# Patient Record
Sex: FEMALE | Race: BLACK OR AFRICAN AMERICAN | Employment: FULL TIME | URBAN - METROPOLITAN AREA
[De-identification: names, ages, dates, MRNs, and addresses within clinical notes are randomized per-mention and may not be internally consistent; named-entity substitution may affect disease eponyms.]

---

## 2017-03-22 ENCOUNTER — OFFICE VISIT (OUTPATIENT)
Dept: FAMILY MEDICINE CLINIC | Age: 22
End: 2017-03-22

## 2017-03-22 VITALS
DIASTOLIC BLOOD PRESSURE: 70 MMHG | BODY MASS INDEX: 24.98 KG/M2 | OXYGEN SATURATION: 99 % | HEART RATE: 66 BPM | SYSTOLIC BLOOD PRESSURE: 101 MMHG | HEIGHT: 63 IN | RESPIRATION RATE: 16 BRPM | TEMPERATURE: 97.8 F | WEIGHT: 141 LBS

## 2017-03-22 DIAGNOSIS — Z23 ENCOUNTER FOR IMMUNIZATION: ICD-10-CM

## 2017-03-22 DIAGNOSIS — Z00.00 ROUTINE GENERAL MEDICAL EXAMINATION AT A HEALTH CARE FACILITY: Primary | ICD-10-CM

## 2017-03-22 NOTE — MR AVS SNAPSHOT
Visit Information Date & Time Provider Department Dept. Phone Encounter #  
 3/22/2017 11:30 AM Sree Walton PA-C [address] [facility] [phone] [id number] Follow-up Instructions Return if symptoms worsen or fail to improve. Upcoming Health Maintenance Date Due  
 HPV AGE 9Y-34Y (1 of 3 - Female 3 Dose Series) 2/9/2007 INFLUENZA AGE 9 TO ADULT 8/1/2016 DTaP/Tdap/Td series (1 - Tdap) 2/9/2017 PAP AKA CERVICAL CYTOLOGY 2/9/2017 Allergies as of 3/22/2017  Review Complete On: 3/22/2017 By: Sree Walton PA-C No Known Allergies Current Immunizations  Never Reviewed Name Date Influenza Vaccine (Quad) PF  Incomplete Not reviewed this visit You Were Diagnosed With   
  
 Codes Comments Routine general medical examination at a health care facility    -  Primary ICD-10-CM: Z00.00 ICD-9-CM: V70.0 Encounter for immunization     ICD-10-CM: E46 ICD-9-CM: V03.89 Vitals BP Pulse Temp Resp Height(growth percentile) Weight(growth percentile) 101/70 (BP 1 Location: Right arm, BP Patient Position: Sitting) 66 97.8 °F (36.6 °C) (Oral) 16 5' 2.5\" (1.588 m) 141 lb (64 kg) LMP SpO2 BMI OB Status Smoking Status 03/14/2017 (Approximate) 99% 25.38 kg/m2 Having regular periods Never Smoker Vitals History BMI and BSA Data Body Mass Index Body Surface Area  
 25.38 kg/m 2 1.68 m 2 Preferred Pharmacy Pharmacy Name Phone [facility] [phone] [facility] [phone] Your Updated Medication List  
  
Notice  As of 3/22/2017 12:19 PM  
 You have not been prescribed any medications. We Performed the Following INFLUENZA VIRUS VAC QUAD,SPLIT,PRESV FREE SYRINGE 3/> YRS IM [id number] CPT(R)] Follow-up Instructions Return if symptoms worsen or fail to improve. Patient Instructions Your physical exam is normal.  
 Your right wrist is normal on exam: prognosis is good, no complications or limitations are expected. Introducing [facility]! Herve Brandon introduces [employer] patient portal. Now you can access parts of your medical record, email your doctor's office, and request medication refills online. 1. In your internet browser, go to [email or web address] 2. Click on the First Time User? Click Here link in the Sign In box. You will see the New Member Sign Up page. 3. Enter your [employer] Access Code exactly as it appears below. You will not need to use this code after youve completed the sign-up process. If you do not sign up before the expiration date, you must request a new code. · [employer] Access Code: [email or web address] Expires: 6/20/2017 11:45 AM 
 
4. Enter the last four digits of your Social Security Number (xxxx) and Date of Birth (mm/dd/yyyy) as indicated and click Submit. You will be taken to the next sign-up page. 5. Create a [employer] ID. This will be your [employer] login ID and cannot be changed, so think of one that is secure and easy to remember. 6. Create a [employer] password. You can change your password at any time. 7. Enter your Password Reset Question and Answer. This can be used at a later time if you forget your password. 8. Enter your e-mail address. You will receive e-mail notification when new information is available in [address] 9. Click Sign Up. You can now view and download portions of your medical record. 10. Click the Download Summary menu link to download a portable copy of your medical information. If you have questions, please visit the Frequently Asked Questions section of the [employer] website. Remember, [employer] is NOT to be used for urgent needs. For medical emergencies, dial 911. Now available from your iPhone and Android! Please provide this summary of care documentation to your next provider. If you have any questions after today's visit, please call [phone].

## 2017-03-22 NOTE — PROGRESS NOTES
Rm 7  Pt presents to the clinic for a CPE for the [employer]. Flu shot requested: no    Depression Screening Completed: yes    Learning Assessment Completed: yes    Abuse Screening Completed: n/a    Health Maintenance reviewed and discussed per provider: yes     Coordination of Care:    1. Have you been to the ER, urgent care clinic since your last visit? Hospitalized since your last visit? no    2. Have you seen or consulted any other health care providers outside of the [address] [place] [address] since your last visit? Include any pap smears or colon screening.  no

## 2017-03-22 NOTE — PATIENT INSTRUCTIONS
Your physical exam is normal.   Your right wrist is normal on exam: prognosis is good, no complications or limitations are expected.

## 2017-03-22 NOTE — PROGRESS NOTES
Pt received influenza vaccine 0.5ml in left deltoid. Tolerated well. No signs or symptoms of distress noted. Most current VIS given and consent signed.

## 2017-03-22 NOTE — PROGRESS NOTES
HISTORY OF PRESENT ILLNESS  Seamus Martines is a 25 y.o. female. HPI Comments: Pt presents for a physical for the [employer]. In particular, she had a stress fracture of the wrist about 4-5 years ago during cheerleading. States she needs clearance specifying diagnosis and prognosis. She denies any pain or loss of ROM in the wrist. Denies any chronic health issues, medication usage, or acute complaints. Complete Physical   Pertinent negatives include no chest pain, no headaches and no shortness of breath. Review of Systems   Constitutional: Negative for chills, fever and malaise/fatigue. HENT: Negative for congestion, ear pain and sore throat. Eyes: Negative for pain. Respiratory: Negative for cough and shortness of breath. Cardiovascular: Negative for chest pain and palpitations. Gastrointestinal: Negative for diarrhea, nausea and vomiting. Genitourinary: Negative for dysuria and hematuria. Musculoskeletal: Negative for back pain, joint pain and myalgias. Skin: Negative for itching and rash. Neurological: Negative for dizziness, weakness and headaches. Psychiatric/Behavioral: Negative for depression. The patient is not nervous/anxious and does not have insomnia. Visit Vitals    /70 (BP 1 Location: Right arm, BP Patient Position: Sitting)    Pulse 66    Temp 97.8 °F (36.6 °C) (Oral)    Resp 16    Ht 5' 2.5\" (1.588 m)    Wt 141 lb (64 kg)    LMP 03/14/2017 (Approximate)    SpO2 99%    BMI 25.38 kg/m2       Physical Exam   Constitutional: She is oriented to person, place, and time. Vital signs are normal. She appears well-developed and well-nourished. She is cooperative. She does not appear ill. No distress. HENT:   Head: Normocephalic and atraumatic. Right Ear: Tympanic membrane, external ear and ear canal normal.   Left Ear: Tympanic membrane, external ear and ear canal normal.   Nose: Nose normal. No mucosal edema or rhinorrhea.    Mouth/Throat: Uvula is midline, oropharynx is clear and moist and mucous membranes are normal.   Eyes: Conjunctivae and EOM are normal. Pupils are equal, round, and reactive to light. Neck: Normal range of motion. Neck supple. No spinous process tenderness and no muscular tenderness present. Cardiovascular: Normal rate, regular rhythm and normal heart sounds. Exam reveals no gallop and no friction rub. No murmur heard. Pulses:       Radial pulses are 2+ on the right side, and 2+ on the left side. Pulmonary/Chest: Effort normal and breath sounds normal. She has no decreased breath sounds. She has no wheezes. She has no rhonchi. She has no rales. Abdominal: Soft. Normal appearance. There is no hepatosplenomegaly. There is no tenderness. No hernia. Hernia confirmed negative in the ventral area. Musculoskeletal: Normal range of motion. She exhibits no edema, tenderness or deformity. Lymphadenopathy:     She has no cervical adenopathy. Neurological: She is alert and oriented to person, place, and time. She has normal strength. She displays a negative Romberg sign. Coordination and gait normal.   Reflex Scores:       Patellar reflexes are 2+ on the right side and 2+ on the left side. CN II-XII   Skin: Skin is warm and dry. No rash noted. Psychiatric: She has a normal mood and affect. Her speech is normal and behavior is normal. Thought content normal.   Nursing note and vitals reviewed. ASSESSMENT and PLAN  Healthy, young female with no medical issues. In particular, there is no pain or deformity noted in the right wrist; FROM. She is cleared for participation in all [employer] training activities. ICD-10-CM ICD-9-CM    1. Routine general medical examination at a health care facility Z00.00 V70.0    2.  Encounter for immunization Z23 V03.89 INFLUENZA VIRUS VAC QUAD,SPLIT,PRESV FREE SYRINGE 3/> YRS IM         Anatoly Sewell PA-C

## 2017-06-20 ENCOUNTER — OFFICE VISIT (OUTPATIENT)
Dept: FAMILY MEDICINE CLINIC | Age: 22
End: 2017-06-20

## 2017-06-20 VITALS
SYSTOLIC BLOOD PRESSURE: 113 MMHG | WEIGHT: 145 LBS | BODY MASS INDEX: 25.69 KG/M2 | HEIGHT: 63 IN | TEMPERATURE: 98.3 F | DIASTOLIC BLOOD PRESSURE: 66 MMHG | HEART RATE: 87 BPM | OXYGEN SATURATION: 98 % | RESPIRATION RATE: 16 BRPM

## 2017-06-20 DIAGNOSIS — L73.9 FOLLICULITIS: Primary | ICD-10-CM

## 2017-06-20 RX ORDER — SULFAMETHOXAZOLE AND TRIMETHOPRIM 800; 160 MG/1; MG/1
1 TABLET ORAL 2 TIMES DAILY
Qty: 20 TAB | Refills: 0 | Status: SHIPPED | OUTPATIENT
Start: 2017-06-20 | End: 2017-06-30

## 2017-06-20 NOTE — PATIENT INSTRUCTIONS
Look for a noncomedogenic body wash. Consider using a body wash with salicylic acid every other day. Check out Dr Yasmeen Dewey suggestions for treating your acne without medication:     http://Visionarity/blog/2015/11/04/10-simple-strategies-to-eliminate-acne/    http://[employer]/blog/2010/05/19/how-to-get-rid-of-acne-pimples-and-other-skin-problems/    [employer] also has some suggestions that may help:   https://Trampoline.AccessData/13-acne-remedies/    A healthy gut contains many species of bacteria and yeast that can help with the breakdown of starches, and improve the function of the immune system (probiotics). Good gut una also help with the absorption of nutrients such as vitamins A,D,E, and K, as well as calcium, iron, and chromium. Probiotic foods or supplements help to add microorganisms to the stomach and intestines. These foods include fermented foods such as yogurt, sauerkraut, kefir, kimchi, natto, and miso. Prebiotics are foods that help feed the existing microorganisms in the gut. These include bananas, artichokes, leeks, garlic, and onions. http://Trampoline. AccessData/probiotics-101/  https://Trampoline.AccessData/19-best-prebiotic-foods/  https://Trampoline. AccessData/8-health-benefits-of-probiotics/  https://Trampoline.AccessData/best-probiotic-supplement/    They may also aid weight loss:   https://you. be/1CJKjWlrDug (from the [place] [address])             Folliculitis: Care Instructions  Your Care Instructions    Folliculitis (say \"jat-YDG-ctg-LY-tus\") is an infection of the pouches (follicles) in the skin where hair grows. It can occur on any part of the body, but it is most common on the scalp, face, armpits, and groin. Bacteria, such as those found in a hot tub, can cause folliculitis. Folliculitis begins as a red, tender area near a strand of hair. The skin can itch or burn and may drain pus or blood.  Sometimes folliculitis can lead to more serious skin infections. Your doctor usually can treat mild folliculitis with an antibiotic cream or ointment. If you have folliculitis on your scalp, you may use a shampoo that kills bacteria. Antibiotics you take as pills can treat infections deeper in the skin. For stubborn cases of folliculitis, laser treatment may be an option. Laser treatment uses strong beams of light to destroy the hair follicle. But hair will no longer grow in the treated area. Follow-up care is a key part of your treatment and safety. Be sure to make and go to all appointments, and call your doctor if you are having problems. It's also a good idea to know your test results and keep a list of the medicines you take. How can you care for yourself at home? · Take your medicine exactly as prescribed. If your doctor prescribed antibiotics, take them as directed. Do not stop taking them just because you feel better. You need to take the full course of antibiotics. · Use a soap that kills bacteria to wash the infected area. If your scalp or beard is infected, use a shampoo with selenium or propylene glycol. Be careful. Do not scrub too long or too hard. · Mix 1 1/3 cup warm water and 1 tablespoon vinegar. Soak a cloth in the mixture, and place it over the infected skin until it cools off (usually 5 to 10 minutes). You can do this 3 to 6 times a day. · Do not share your razor, towel, or washcloth. That can spread folliculitis. · Use a new blade in your razor each time you shave to keep from re-infecting your skin. · If you tend to get folliculitis, avoid using hot tubs. They can contain bacteria that cause folliculitis. When should you call for help? Call your doctor now or seek immediate medical care if:  · You have symptoms of infection, such as:  ¨ Increased pain, swelling, warmth, or redness. ¨ Red streaks leading from the area. ¨ Pus draining from the area. ¨ A fever.   Watch closely for changes in your health, and be sure to contact your doctor if:  · You do not get better as expected. Where can you learn more? Go to http://tray-michelle.info/. Enter M257 in the search box to learn more about \"Folliculitis: Care Instructions. \"  Current as of: October 13, 2016  Content Version: 11.3  © 8623-5610 [employer]. Care instructions adapted under license by [employer] (which disclaims liability or warranty for this information). If you have questions about a medical condition or this instruction, always ask your healthcare professional. [address] any warranty or liability for your use of this information.

## 2017-06-20 NOTE — PROGRESS NOTES
HISTORY OF PRESENT ILLNESS  Jakub Gardner is a 25 y.o. female. HPI Comments: Pt presents with her mom with sore bumps on her legs, buttocks, and lower abdomen. She notes she has been getting them since around the start of her pregnancy in 2015, but notes they have become particularly bad over the past month. The pain is worse with showering, or contact with the bumps; which drain pus at times. Stats she has addressed this with other providers previously, but was merely told to come back if the condition worsens. Mom notes that oleg also gets abscesses. States she was given Bactrim in December for two cysts in the vaginal area that ruptured, draining pus. She has a Mirena IUD in place that was placed in December of 2015, but mom notes the abscesses and folliculitis started around the beginning of her pregnancy (earlier in the year). She currently washes with Dove soap. Skin Problem   Pertinent negatives include no shortness of breath. Review of Systems   Constitutional: Negative for chills and fever. Respiratory: Negative for cough and shortness of breath. Genitourinary: Negative for dysuria. Denies vaginal discharge   Musculoskeletal: Positive for myalgias (worse with working). Skin: Positive for rash. Negative for itching. Neurological: Negative for tingling. Visit Vitals    /66 (BP 1 Location: Right arm, BP Patient Position: Sitting)    Pulse 87    Temp 98.3 °F (36.8 °C) (Oral)    Resp 16    Ht 5' 2.5\" (1.588 m)    Wt 145 lb (65.8 kg)    LMP 06/06/2017 (Approximate)    SpO2 98%    BMI 26.1 kg/m2       Physical Exam   Constitutional: She is oriented to person, place, and time. Vital signs are normal. She appears well-developed and well-nourished. She is cooperative. She does not appear ill. No distress. HENT:   Head: Normocephalic and atraumatic.    Right Ear: External ear normal.   Left Ear: External ear normal.   Nose: Nose normal.   Mouth/Throat: Uvula is midline, oropharynx is clear and moist and mucous membranes are normal.   Eyes: Conjunctivae are normal.   Neck: Neck supple. Cardiovascular: Normal rate, regular rhythm and normal heart sounds. Pulses:       Radial pulses are 2+ on the right side, and 2+ on the left side. Pulmonary/Chest: Effort normal and breath sounds normal. She has no decreased breath sounds. She has no wheezes. She has no rhonchi. She has no rales. Lymphadenopathy:     She has no cervical adenopathy. Right: No inguinal adenopathy present. Left: No inguinal adenopathy present. Neurological: She is alert and oriented to person, place, and time. Skin: Skin is warm and dry. Rash noted. Rash is nodular and pustular. Rash is not vesicular. She is not diaphoretic. Pt has multiple nodular lesions in various stages of eruption/resolution on the lower abdomen and posterior thighs. Most of the nodules are ~ 1 cm in size. Some are markedly erythematous, but without red streaking. No drainage of pus appreciated. The nodules are tender, but there is no increased warmth. Psychiatric: She has a normal mood and affect. Her speech is normal and behavior is normal. Thought content normal.   Nursing note and vitals reviewed. ASSESSMENT and PLAN    ICD-10-CM ICD-9-CM    1. Folliculitis H58.9 951.5 trimethoprim-sulfamethoxazole (BACTRIM DS, SEPTRA DS) 160-800 mg per tablet      REFERRAL TO DERMATOLOGY     Recommended non-comedogenic body washes and moisturizers, with use of a salicylic acid body wash every other day. Also provided links to articles by Dr Prachi Pryor and [employer] and briefly discussed a trial elimination of dairy; and the role of diet in skin health. Encouraged reduced consumption of sugar and refined carbohydrates; increased consumption of vegetables & fruits. Provided after-visit information on  Folliculitis. Reviewed reasons to return or seek emergency care.     Pt verbalized understanding and agreement with the plan of care.     Disha Marcos PA-C

## 2017-06-20 NOTE — MR AVS SNAPSHOT
Visit Information Date & Time Provider Department Dept. Phone Encounter #  
 6/20/2017  2:00 PM Pearla Pallas, PA-C [facility] [phone] [id number] Upcoming Health Maintenance Date Due  
 HPV AGE 9Y-34Y (1 of 3 - Female 3 Dose Series) 2/9/2007 DTaP/Tdap/Td series (1 - Tdap) 2/9/2017 PAP AKA CERVICAL CYTOLOGY 2/9/2017 INFLUENZA AGE 9 TO ADULT 8/1/2017 Allergies as of 6/20/2017  Review Complete On: 6/20/2017 By: Pearla Pallas, PA-C No Known Allergies Current Immunizations  Never Reviewed Name Date Influenza Vaccine (Quad) PF 3/22/2017 Not reviewed this visit You Were Diagnosed With   
  
 Codes Comments Folliculitis    -  Primary ICD-10-CM: L73.9 ICD-9-CM: 704.8 Vitals BP Pulse Temp Resp Height(growth percentile) Weight(growth percentile) 113/66 (BP 1 Location: Right arm, BP Patient Position: Sitting) 87 98.3 °F (36.8 °C) (Oral) 16 5' 2.5\" (1.588 m) 145 lb (65.8 kg) LMP SpO2 BMI OB Status Smoking Status 06/06/2017 (Approximate) 98% 26.1 kg/m2 Having regular periods Never Smoker BMI and BSA Data Body Mass Index Body Surface Area  
 26.1 kg/m 2 1.7 m 2 Preferred Pharmacy Pharmacy Name Phone [facility] [place] [address], [address] [facility] [phone] Your Updated Medication List  
  
   
This list is accurate as of: 6/20/17  2:46 PM.  Always use your most recent med list.  
  
  
  
  
 trimethoprim-sulfamethoxazole 160-800 mg per tablet Commonly known as:  BACTRIM DS, SEPTRA DS Take 1 Tab by mouth two (2) times a day for 10 days. Prescriptions Sent to Pharmacy Refills  
 trimethoprim-sulfamethoxazole (BACTRIM DS, SEPTRA DS) 160-800 mg per tablet 0 Sig: Take 1 Tab by mouth two (2) times a day for 10 days. Class: Normal  
 Pharmacy: [address] [place] [address], [address] [facility] Ph #: [phone]  Route: Oral  
  
 We Performed the Following REFERRAL TO DERMATOLOGY [REF19 Custom] Comments:  
 Please evaluate patient for recurrent folliculitis [place] [facility] Referral Information Referral ID Referred By Referred To  
  
 [id number] DIANDRA [address], [place]   
   [address] [facility] [address] Phone: [phone] Fax: [phone] Visits Status Start Date End Date 1 New Request 6/20/17 6/20/18 If your referral has a status of pending review or denied, additional information will be sent to support the outcome of this decision. Patient Instructions Look for a noncomedogenic body wash. Consider using a body wash with salicylic acid every other day. Check out Dr Doug Gan suggestions for treating your acne without medication:  
 
http://[employer]/blog/2015/11/04/10-simple-strategies-to-eliminate-acne/ 
 
http://[employer]/blog/2010/05/19/how-to-get-rid-of-acne-pimples-and-other-skin-problems/ Authority Nutrition also has some suggestions that may help:  
https://[employer].Appifier/13-acne-remedies/ 
 
A healthy gut contains many species of bacteria and yeast that can help with the breakdown of starches, and improve the function of the immune system (probiotics). Good gut una also help with the absorption of nutrients such as vitamins A,D,E, and K, as well as calcium, iron, and chromium. Probiotic foods or supplements help to add microorganisms to the stomach and intestines. These foods include fermented foods such as yogurt, sauerkraut, kefir, kimchi, natto, and miso. Prebiotics are foods that help feed the existing microorganisms in the gut. These include bananas, artichokes, leeks, garlic, and onions. http://authoritynutrition. com/probiotics-101/ 
https://authoritynutrition.com/19-best-prebiotic-foods/ 
https://Canatunutrition. com/8-health-benefits-of-probiotics/ 
https://authorityReveal Technology.com/best-probiotic-supplement/ 
 
 They may also aid weight loss:  
https://youtu. be/1CJKjWlrDug (from the [place] [address]) Folliculitis: Care Instructions Your Care Instructions Folliculitis (say \"yaf-TIP-gem-NEREYDA-kadi\") is an infection of the pouches (follicles) in the skin where hair grows. It can occur on any part of the body, but it is most common on the scalp, face, armpits, and groin. Bacteria, such as those found in a hot tub, can cause folliculitis. Folliculitis begins as a red, tender area near a strand of hair. The skin can itch or burn and may drain pus or blood. Sometimes folliculitis can lead to more serious skin infections. Your doctor usually can treat mild folliculitis with an antibiotic cream or ointment. If you have folliculitis on your scalp, you may use a shampoo that kills bacteria. Antibiotics you take as pills can treat infections deeper in the skin. For stubborn cases of folliculitis, laser treatment may be an option. Laser treatment uses strong beams of light to destroy the hair follicle. But hair will no longer grow in the treated area. Follow-up care is a key part of your treatment and safety. Be sure to make and go to all appointments, and call your doctor if you are having problems. It's also a good idea to know your test results and keep a list of the medicines you take. How can you care for yourself at home? · Take your medicine exactly as prescribed. If your doctor prescribed antibiotics, take them as directed. Do not stop taking them just because you feel better. You need to take the full course of antibiotics. · Use a soap that kills bacteria to wash the infected area. If your scalp or beard is infected, use a shampoo with selenium or propylene glycol. Be careful. Do not scrub too long or too hard. · Mix 1 1/3 cup warm water and 1 tablespoon vinegar.  Soak a cloth in the mixture, and place it over the infected skin until it cools off (usually 5 to 10 minutes). You can do this 3 to 6 times a day. · Do not share your razor, towel, or washcloth. That can spread folliculitis. · Use a new blade in your razor each time you shave to keep from re-infecting your skin. · If you tend to get folliculitis, avoid using hot tubs. They can contain bacteria that cause folliculitis. When should you call for help? Call your doctor now or seek immediate medical care if: 
· You have symptoms of infection, such as: 
¨ Increased pain, swelling, warmth, or redness. ¨ Red streaks leading from the area. ¨ Pus draining from the area. ¨ A fever. Watch closely for changes in your health, and be sure to contact your doctor if: 
· You do not get better as expected. Where can you learn more? Go to http://tray-michelle.info/. Enter M257 in the search box to learn more about \"Folliculitis: Care Instructions. \" Current as of: October 13, 2016 Content Version: 11.3 © 6425-9852 [employer]ap. Care instructions adapted under license by [employer] (which disclaims liability or warranty for this information). If you have questions about a medical condition or this instruction, always ask your healthcare professional. [address] any warranty or liability for your use of this information. Introducing [facility] [place] & HEALTH SERVICES! Asa Mo introduces [employer] patient portal. Now you can access parts of your medical record, email your doctor's office, and request medication refills online. 1. In your internet browser, go to [email or web address] 2. Click on the First Time User? Click Here link in the Sign In box. You will see the New Member Sign Up page. 3. Enter your Ent[employer]Soft Access Code exactly as it appears below. You will not need to use this code after youve completed the sign-up process. If you do not sign up before the expiration date, you must request a new code. · Nanoflex Access Code: [email or web address] Expires: 9/18/2017  2:46 PM 
 
4. Enter the last four digits of your Social Security Number (xxxx) and Date of Birth (mm/dd/yyyy) as indicated and click Submit. You will be taken to the next sign-up page. 5. Create a Nanoflex ID. This will be your Nanoflex login ID and cannot be changed, so think of one that is secure and easy to remember. 6. Create a Nanoflex password. You can change your password at any time. 7. Enter your Password Reset Question and Answer. This can be used at a later time if you forget your password. 8. Enter your e-mail address. You will receive e-mail notification when new information is available in [address] 9. Click Sign Up. You can now view and download portions of your medical record. 10. Click the Download Summary menu link to download a portable copy of your medical information. If you have questions, please visit the Frequently Asked Questions section of the Nanoflex website. Remember, Nanoflex is NOT to be used for urgent needs. For medical emergencies, dial 911. Now available from your iPhone and Android! Please provide this summary of care documentation to your next provider. If you have any questions after today's visit, please call [phone].

## 2017-08-03 ENCOUNTER — OFFICE VISIT (OUTPATIENT)
Dept: FAMILY MEDICINE CLINIC | Age: 22
End: 2017-08-03

## 2017-08-03 VITALS
RESPIRATION RATE: 16 BRPM | TEMPERATURE: 98.2 F | HEIGHT: 63 IN | SYSTOLIC BLOOD PRESSURE: 112 MMHG | DIASTOLIC BLOOD PRESSURE: 67 MMHG | OXYGEN SATURATION: 99 % | BODY MASS INDEX: 24.8 KG/M2 | HEART RATE: 78 BPM | WEIGHT: 140 LBS

## 2017-08-03 DIAGNOSIS — R93.6 ABNORMAL X-RAY OF EXTREMITY: Primary | ICD-10-CM

## 2017-08-03 NOTE — PATIENT INSTRUCTIONS
If you get your xray today, I can have your letter written tomorrow (assuming everything is ok)  If it's abnormal, I'll get you into ortho ASAP for clearance.

## 2017-08-03 NOTE — MR AVS SNAPSHOT
Visit Information Date & Time Provider Department Dept. Phone Encounter #  
 8/3/2017  2:45 PM Abilio Zeng, [address] [phone] [id number] Follow-up Instructions Return if symptoms worsen or fail to improve. Upcoming Health Maintenance Date Due  
 HPV AGE 9Y-34Y (1 of 3 - Female 3 Dose Series) 2/9/2007 DTaP/Tdap/Td series (1 - Tdap) 2/9/2017 PAP AKA CERVICAL CYTOLOGY 2/9/2017 INFLUENZA AGE 9 TO ADULT 8/1/2017 Allergies as of 8/3/2017  Review Complete On: 8/3/2017 By: Abilio Zeng MD  
 No Known Allergies Current Immunizations  Never Reviewed Name Date Influenza Vaccine (Quad) PF 3/22/2017 Not reviewed this visit You Were Diagnosed With   
  
 Codes Comments Abnormal x-ray of extremity    -  Primary ICD-10-CM: R93.7 ICD-9-CM: 793.7 Vitals BP Pulse Temp Resp Height(growth percentile) Weight(growth percentile) 112/67 (BP 1 Location: Right arm, BP Patient Position: Sitting) 78 98.2 °F (36.8 °C) (Oral) 16 5' 2.5\" (1.588 m) 140 lb (63.5 kg) LMP SpO2 BMI OB Status Smoking Status 08/02/2017 (Exact Date) 99% 25.2 kg/m2 Having regular periods Never Smoker Vitals History BMI and BSA Data Body Mass Index Body Surface Area  
 25.2 kg/m 2 1.67 m 2 Preferred Pharmacy Pharmacy Name Phone [facility] [address] [facility] [phone] Your Updated Medication List  
  
Notice  As of 8/3/2017  3:27 PM  
 You have not been prescribed any medications. Follow-up Instructions Return if symptoms worsen or fail to improve. To-Do List   
 08/03/2017 Imaging:  XR HUMERUS RT Patient Instructions If you get your xray today, I can have your letter written tomorrow (assuming everything is ok) If it's abnormal, I'll get you into ortho ASAP for clearance. Introducing \[facility] [place] [facility]\"" & HEALTH SERVICES! [employer] introduces International [employer] patient portal. Now you can access parts of your medical record, email your doctor's office, and request medication refills online. 1. In your internet browser, go to [email or web address] 2. Click on the First Time User? Click Here link in the Sign In box. You will see the New Member Sign Up page. 3. Enter your International [employer] Organization Access Code exactly as it appears below. You will not need to use this code after youve completed the sign-up process. If you do not sign up before the expiration date, you must request a new code. · International [employer] Organization Access Code: [email or web address] Expires: 9/18/2017  2:46 PM 
 
4. Enter the last four digits of your Social Security Number (xxxx) and Date of Birth (mm/dd/yyyy) as indicated and click Submit. You will be taken to the next sign-up page. 5. Create a International Youth Organization ID. This will be your International [employer] Organization login ID and cannot be changed, so think of one that is secure and easy to remember. 6. Create a International [employer] Organization password. You can change your password at any time. 7. Enter your Password Reset Question and Answer. This can be used at a later time if you forget your password. 8. Enter your e-mail address. You will receive e-mail notification when new information is available in [address] 9. Click Sign Up. You can now view and download portions of your medical record. 10. Click the Download Summary menu link to download a portable copy of your medical information. If you have questions, please visit the Frequently Asked Questions section of the International Youth Organization website. Remember, International [employer] Organization is NOT to be used for urgent needs. For medical emergencies, dial 911. Now available from your iPhone and Android! Please provide this summary of care documentation to your next provider. If you have any questions after today's visit, please call [phone].

## 2017-08-03 NOTE — PROGRESS NOTES
HISTORY OF PRESENT ILLNESS  Amalia Nelson is a 25 y.o. female. HPI Comments: Krista Gonzalez is here to get her R arm evaluated. Five years ago she inured her arm while cheerleading. She was diagnosed with a shoulder contusion and a \"stress fracture of her wrist\". She was treated, everything got better and she is pain-free now. During the course of that workup, they found something incidentally in her humerus area (? 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      Review of Systems   Constitutional: Negative for chills and fever. Eyes: Negative for blurred vision and double vision. Respiratory: Negative for cough and shortness of breath. Cardiovascular: Negative for chest pain and palpitations. Gastrointestinal: Negative for abdominal pain, nausea and vomiting. Neurological: Negative for headaches. Physical Exam   Constitutional: Vital signs are normal. She appears well-developed and well-nourished. HENT:   Right Ear: Tympanic membrane and ear canal normal.   Left Ear: Tympanic membrane and ear canal normal.   Nose: Nose normal.   Mouth/Throat: Uvula is midline, oropharynx is clear and moist and mucous membranes are normal.   Eyes: Pupils are equal, round, and reactive to light. Cardiovascular: Normal rate and regular rhythm. Pulmonary/Chest: Effort normal and breath sounds normal.   Musculoskeletal:   R arm, shoulder, elbow, wrist all non-tender, no swelling, full ROM   Skin: No rash noted. Nursing note and vitals reviewed. ASSESSMENT and PLAN    ICD-10-CM ICD-9-CM    1. Abnormal x-ray of extremity R93.7 793.7 XR HUMERUS RT       Will get an xray today.

## 2017-08-03 NOTE — PROGRESS NOTES
Rm 1  Pt presents to the clinic requesting an XR of her right arm. Pt states she was told many years ago that something was found on an old XR and the [employer] wants clarification as to what it is. Depression Screening Completed: yes    Learning Assessment Completed: yes    Abuse Screening Completed: n/a    Health Maintenance reviewed and discussed per provider: yes     Coordination of Care:    1. Have you been to the ER, urgent care clinic since your last visit? Hospitalized since your last visit? no    2. Have you seen or consulted any other health care providers outside of the [address] [place] [address] since your last visit? Include any pap smears or colon screening.  no

## 2017-08-07 ENCOUNTER — TELEPHONE (OUTPATIENT)
Dept: FAMILY MEDICINE CLINIC | Age: 22
End: 2017-08-07

## 2017-08-07 DIAGNOSIS — R93.6 ABNORMAL X-RAY OF EXTREMITY: Primary | ICD-10-CM

## 2017-08-07 NOTE — TELEPHONE ENCOUNTER
----- Message from Mel Mustafa LPN sent at 8/8/7363  7:45 AM EDT -----  Pt called back in regards to her XR results. I informed her of her abnormal lesion and the recommendation for an MRI. Pt verbalized understanding and would like the MRI ordered. I provided the number to central scheduling to schedule the MRI.

## 2017-08-07 NOTE — TELEPHONE ENCOUNTER
Pt requesting an update on image request.   Pt was informed she will receive a call once order was placed. Pt verbalized understanding.

## 2017-08-16 ENCOUNTER — HOSPITAL ENCOUNTER (OUTPATIENT)
Dept: MRI IMAGING | Age: 22
Discharge: HOME | End: 2017-08-16
Attending: FAMILY MEDICINE
Payer: MEDICAID

## 2017-08-16 VITALS — WEIGHT: 140 LBS | BODY MASS INDEX: 25.2 KG/M2

## 2017-08-16 DIAGNOSIS — R93.6 ABNORMAL X-RAY OF EXTREMITY: ICD-10-CM

## 2017-08-16 PROCEDURE — 73220 MRI UPPR EXTREMITY W/O&W/DYE: CPT

## 2017-08-16 PROCEDURE — 74011250636 HC RX REV CODE- 250/636: Performed by: FAMILY MEDICINE

## 2017-08-16 PROCEDURE — A9585 GADOBUTROL INJECTION: HCPCS | Performed by: FAMILY MEDICINE

## 2017-08-16 RX ADMIN — GADOBUTROL 6.5 ML: 604.72 INJECTION INTRAVENOUS at 15:36

## 2017-08-21 ENCOUNTER — TELEPHONE (OUTPATIENT)
Dept: FAMILY MEDICINE CLINIC | Age: 22
End: 2017-08-21

## 2017-08-21 NOTE — TELEPHONE ENCOUNTER
Patient called the office requesting her lab results. Patient was informed Dr. Nathalia Ny reviewed her MRI results and it indicated from a medical standpoint the lesion appears benign. Patient was advised if further clearance id needed we would have to refer her to the specialist.    Patient stated she is going out of town on Thursday and would like a copy of the xray and MRI faxed over to the [facility]. Patient verbalized understanding and had no further questions.

## 2017-08-22 NOTE — PROGRESS NOTES
Patient called the office requesting her lab results. Patient was informed Dr. Rowan Gandhi reviewed her MRI results and it indicated from a medical standpoint the lesion appears benign. Patient was advised if further clearance is needed we would have to refer her to the specialist.     Patient stated she is going out of town on Thursday and would like a copy of the xray and MRI faxed over to the [facility] [employer]. Patient verbalized understanding and had no further questions.

## 2017-09-05 ENCOUNTER — TELEPHONE (OUTPATIENT)
Dept: FAMILY MEDICINE CLINIC | Age: 22
End: 2017-09-05

## 2017-09-05 NOTE — TELEPHONE ENCOUNTER
Pt is requesting an MRI clearance be faxed over to [facility] [phone]. Clearance must state no findings in MRI and Pt has no restriction.

## 2017-09-06 ENCOUNTER — TELEPHONE (OUTPATIENT)
Dept: FAMILY MEDICINE CLINIC | Age: 22
End: 2017-09-06

## 2017-09-06 DIAGNOSIS — R93.6 ABNORMAL MRI, SHOULDER: Primary | ICD-10-CM

## 2017-09-11 NOTE — TELEPHONE ENCOUNTER
Patient called the office on 09/06/17 for a referral to ortho for medical clearance.      Patient has an appointment scheduled for 09/27/17 at 2:30 pm.

## 2017-09-27 ENCOUNTER — OFFICE VISIT (OUTPATIENT)
Dept: ORTHOPEDIC SURGERY | Age: 22
End: 2017-09-27

## 2017-09-27 VITALS
DIASTOLIC BLOOD PRESSURE: 68 MMHG | RESPIRATION RATE: 18 BRPM | HEIGHT: 63 IN | SYSTOLIC BLOOD PRESSURE: 112 MMHG | HEART RATE: 72 BPM | BODY MASS INDEX: 25.2 KG/M2 | WEIGHT: 142.2 LBS | TEMPERATURE: 98.2 F | OXYGEN SATURATION: 100 %

## 2017-09-27 DIAGNOSIS — D21.11: Primary | ICD-10-CM

## 2017-09-27 NOTE — PROGRESS NOTES
Yesenia Anaya  1995   Chief Complaint   Patient presents with    Shoulder Pain     Right        HISTORY OF PRESENT ILLNESS  Carmin Lanes is a 25 y.o. female who presents today for evaluation of right shoulder pain. Patient referred by Dr. Noah Drake for further evaluation. she rates her pain 0/10 today. She states she injured the arm while in high school about 6 years ago. She had an MRI of that time but never sought further treatment. 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 She states she has not had any recent trouble with the arm. She has no associated symptoms associated with the findings on the MRI. Has tried following treatments: Injections:NO; Brace:NO; Therapy:NO; Cane/Crutch:NO       No Known Allergies     History reviewed. No pertinent past medical history. Social History     Social History    Marital status: SINGLE     Spouse name: N/A    Number of children: N/A    Years of education: N/A     Occupational History    Not on file. Social History Main Topics    Smoking status: Never Smoker    Smokeless tobacco: Never Used    Alcohol use Yes      Comment: socially    Drug use: No    Sexual activity: Yes     Partners: Male     Other Topics Concern    Not on file     Social History Narrative      History reviewed. No pertinent surgical history. Family History   Problem Relation Age of Onset    No Known Problems Mother     No Known Problems Father       No current outpatient prescriptions on file. No current facility-administered medications for this visit. REVIEW OF SYSTEM   Patient denies: Weight loss, Fever/Chills, HA, Visual changes, Fatigue, Chest pain, SOB, Abdominal pain, N/V/D/C, Blood in stool or urine, Edema. Pertinent positive as above in HPI.  All others were negative    PHYSICAL EXAM:   Visit Vitals    /68    Pulse 72    Temp 98.2 °F (36.8 °C) (Oral)    Resp 18    Ht 5' 2.5\" (1.588 m)    Wt 142 lb 3.2 oz (64.5 kg)  SpO2 100%    BMI 25.59 kg/m2     The patient is a well-developed, well-nourished female   in no acute distress. The patient is alert and oriented times three. The patient is alert and oriented times three. Mood and affect are normal.  LYMPHATIC: lymph nodes are not enlarged and are within normal limits  SKIN: normal in color and non tender to palpation. There are no bruises or abrasions noted. NEUROLOGICAL: Motor sensory exam is within normal limits. Reflexes are equal bilaterally. There is normal sensation to pinprick and light touch  MUSCULOSKELETAL:  Examination Right shoulder   Skin Intact   AC joint tenderness -   Biceps tenderness -   Forward flexion/Elevation    Active abduction    Glenohumeral abduction 90   External rotation ROM 90   Internal rotation ROM 70   Apprehension -   Elmers Relocation -   Jerk -   Load and Shift -   Obriens -   Speeds -   Impingement sign -   Supraspinatus/Empty Can -, 5/5   External Rotation Strength -, 5/5   Lift Off/Belly Press -, 5/5   Neurovascular Intact        IMAGING:   MRI of the right humerus dated 8/16/17 was reviewed and read:   IMPRESSION:  Proximal right humerus lesion has a nonaggressive appearance by MRI imaging. Location and eccentric position favor nonossifying fibroma. No extraosseous component, cortical defect or periosteal reaction. Correlation with prior radiographs would be helpful and an addendum can be generated once they are available for comparison. XR of the right humerus dated 8/3/17 was reviewed and read:   IMPRESSION:  Indeterminate 2.3 cm lesion within the right proximal humerus, possibly nonossifying fibroma. Recommend MRI for further evaluation. IMPRESSION:      ICD-10-CM ICD-9-CM    1. 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 I reviewed the results of the MRI. Patient shows no symptoms associated with the findings on the MRI.  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 Risk factors include: none  2. No cortisone injection indicated today   3. No Physical/Occupational Therapy indicated today  4. No diagnostic test indicated today  5. No durable medical equipment indicated today  6. No referral indicated today   7. No medications indicated today  8. No Narcotic indicated today     RTC PRN  Office note will be sent to the referring provider. Follow-up Disposition: Not on File    Scribed by [facility]) as dictated by MD JOSEMANUEL Infante, Dr. Jordyn Butler, confirm that all documentation is accurate.     Jordyn Butler M.D.   [facility]

## 2017-09-27 NOTE — LETTER
9/27/2017 3:10 PM 
 
Ms. Gil [facility] [address] [address] To Whom It May Concern: 
 
Jovi Astorga is currently under the care of [address] [place], [place] [address]. Ms. Jj Yañez is cleared for any and all activities from an orthopaedic stand point. If there are questions or concerns please have the patient contact our office. Sincerely, Vivian Riley MD